# Patient Record
Sex: FEMALE | Race: WHITE
[De-identification: names, ages, dates, MRNs, and addresses within clinical notes are randomized per-mention and may not be internally consistent; named-entity substitution may affect disease eponyms.]

---

## 2018-01-31 ENCOUNTER — HOSPITAL ENCOUNTER (EMERGENCY)
Dept: HOSPITAL 72 - EMR | Age: 33
Discharge: HOME | End: 2018-01-31
Payer: COMMERCIAL

## 2018-01-31 VITALS — HEIGHT: 68 IN | BODY MASS INDEX: 21.22 KG/M2 | WEIGHT: 140 LBS

## 2018-01-31 VITALS — DIASTOLIC BLOOD PRESSURE: 101 MMHG | SYSTOLIC BLOOD PRESSURE: 147 MMHG

## 2018-01-31 VITALS — SYSTOLIC BLOOD PRESSURE: 128 MMHG | DIASTOLIC BLOOD PRESSURE: 84 MMHG

## 2018-01-31 VITALS — SYSTOLIC BLOOD PRESSURE: 131 MMHG | DIASTOLIC BLOOD PRESSURE: 76 MMHG

## 2018-01-31 VITALS — SYSTOLIC BLOOD PRESSURE: 129 MMHG | DIASTOLIC BLOOD PRESSURE: 90 MMHG

## 2018-01-31 DIAGNOSIS — F90.9: ICD-10-CM

## 2018-01-31 DIAGNOSIS — Z79.899: ICD-10-CM

## 2018-01-31 DIAGNOSIS — O03.1: Primary | ICD-10-CM

## 2018-01-31 DIAGNOSIS — F41.9: ICD-10-CM

## 2018-01-31 LAB
ADD MANUAL DIFF: NO
ALBUMIN SERPL-MCNC: 3.7 G/DL (ref 3.4–5)
ALBUMIN/GLOB SERPL: 1.3 {RATIO} (ref 1–2.7)
ALP SERPL-CCNC: 81 U/L (ref 46–116)
ALT SERPL-CCNC: 17 U/L (ref 12–78)
ANION GAP SERPL CALC-SCNC: 9 MMOL/L (ref 5–15)
APPEARANCE UR: CLEAR
APTT PPP: (no result) S
AST SERPL-CCNC: 19 U/L (ref 15–37)
BASOPHILS NFR BLD AUTO: 0.3 % (ref 0–2)
BILIRUB SERPL-MCNC: 0.5 MG/DL (ref 0.2–1)
BUN SERPL-MCNC: 5 MG/DL (ref 7–18)
CALCIUM SERPL-MCNC: 8.8 MG/DL (ref 8.5–10.1)
CHLORIDE SERPL-SCNC: 102 MMOL/L (ref 98–107)
CO2 SERPL-SCNC: 27 MMOL/L (ref 21–32)
CREAT SERPL-MCNC: 1 MG/DL (ref 0.55–1.3)
EOSINOPHIL NFR BLD AUTO: 0.6 % (ref 0–3)
ERYTHROCYTE [DISTWIDTH] IN BLOOD BY AUTOMATED COUNT: 10.2 % (ref 11.6–14.8)
GLOBULIN SER-MCNC: 2.8 G/DL
GLUCOSE UR STRIP-MCNC: NEGATIVE MG/DL
HCT VFR BLD CALC: 35.9 % (ref 37–47)
HGB BLD-MCNC: 12.8 G/DL (ref 12–16)
KETONES UR QL STRIP: NEGATIVE
LEUKOCYTE ESTERASE UR QL STRIP: NEGATIVE
LYMPHOCYTES NFR BLD AUTO: 13.7 % (ref 20–45)
MCV RBC AUTO: 92 FL (ref 80–99)
MONOCYTES NFR BLD AUTO: 5.8 % (ref 1–10)
NEUTROPHILS NFR BLD AUTO: 79.5 % (ref 45–75)
NITRITE UR QL STRIP: NEGATIVE
PH UR STRIP: 7 [PH] (ref 4.5–8)
PLATELET # BLD: 219 K/UL (ref 150–450)
POTASSIUM SERPL-SCNC: 4.3 MMOL/L (ref 3.5–5.1)
PROT UR QL STRIP: NEGATIVE
RBC # BLD AUTO: 3.89 M/UL (ref 4.2–5.4)
SODIUM SERPL-SCNC: 137 MMOL/L (ref 136–145)
SP GR UR STRIP: 1 (ref 1–1.03)
UROBILINOGEN UR-MCNC: NORMAL MG/DL (ref 0–1)
WBC # BLD AUTO: 8.6 K/UL (ref 4.8–10.8)

## 2018-01-31 PROCEDURE — 81003 URINALYSIS AUTO W/O SCOPE: CPT

## 2018-01-31 PROCEDURE — 86901 BLOOD TYPING SEROLOGIC RH(D): CPT

## 2018-01-31 PROCEDURE — 86900 BLOOD TYPING SEROLOGIC ABO: CPT

## 2018-01-31 PROCEDURE — 36415 COLL VENOUS BLD VENIPUNCTURE: CPT

## 2018-01-31 PROCEDURE — 85025 COMPLETE CBC W/AUTO DIFF WBC: CPT

## 2018-01-31 PROCEDURE — 96375 TX/PRO/DX INJ NEW DRUG ADDON: CPT

## 2018-01-31 PROCEDURE — 80053 COMPREHEN METABOLIC PANEL: CPT

## 2018-01-31 PROCEDURE — 86850 RBC ANTIBODY SCREEN: CPT

## 2018-01-31 PROCEDURE — 76856 US EXAM PELVIC COMPLETE: CPT

## 2018-01-31 PROCEDURE — 99284 EMERGENCY DEPT VISIT MOD MDM: CPT

## 2018-01-31 PROCEDURE — 76830 TRANSVAGINAL US NON-OB: CPT

## 2018-01-31 PROCEDURE — 84702 CHORIONIC GONADOTROPIN TEST: CPT

## 2018-01-31 PROCEDURE — 96374 THER/PROPH/DIAG INJ IV PUSH: CPT

## 2018-01-31 PROCEDURE — 96361 HYDRATE IV INFUSION ADD-ON: CPT

## 2018-01-31 NOTE — CONSULTATION
Consult Note


Consult Note


GYNECOLOGY ER CONSULT NOTE





CC: Heavy bleeding, incomplete 





HPI: Vanessa is a 33yo  s/p MedAb 2w ago presenting with heavy vaginal 

bleeding. [facility] for MedAb at ~7-8w. She 

reports light bleeding after medication, with some small clots, but no heavy 

bleeding until yesterday when she had heavy bleeding with passage of clot. She 

called [facility] and pad counts were recommended, with recommendation 

to go to ED for >1 pad per hour for more than 2 hours. She reports that she didn

't quite meet the cutoff, but that today she soaked through multiple super 

tampons and several pads prompting her visit to the ED. Currently denies 

lightheadedness, dizziness, or palpitations. The PA in the ED attempted to 

remove intracervical contents, but was unsuccessful, prompting the request for 

GYN consult. 





PMHx: ADHD and Anxiety





PSHx: D&C x1





Meds: Adderall, Xanax, Klonopin (all prn)





Allergies: NKDA





OBHx: , TAB x 1 and current





GYNHx: denies any previous GYN hx





VITALS:


Tmax 98.6, /76, P 96, RR 15, O2 100% RA





EXAM:


Gen: NAD, A&O, pleasant, no pallor noted


Neuro: Intact grossly


HEENT: MMM, OP clear


Neck: No thyromegaly


CV: RRR


Pulm: No increased work of breathing, speaking in full sentences


Abd: soft, NTND


Pelvic: Speculum exam revealed ~20cc of clot and POC noted in vaginal vault, os 

open, with POC protruding from cervical canal. 


Ext: WWP, FROM





Laboratory Tests








Test


  18


15:25 18


15:46


 


White Blood Count


  8.6 K/UL


(4.8-10.8) 


 


 


Red Blood Count


  3.89 M/UL


(4.20-5.40)  L 


 


 


Hemoglobin


  12.8 G/DL


(12.0-16.0) 


 


 


Hematocrit


  35.9 %


(37.0-47.0)  L 


 


 


Mean Corpuscular Volume 92 FL (80-99)   


 


Mean Corpuscular Hemoglobin


  32.8 PG


(27.0-31.0)  H 


 


 


Mean Corpuscular Hemoglobin


Concent 35.5 G/DL


(32.0-36.0) 


 


 


Red Cell Distribution Width


  10.2 %


(11.6-14.8)  L 


 


 


Platelet Count


  219 K/UL


(150-450) 


 


 


Mean Platelet Volume


  8.1 FL


(6.5-10.1) 


 


 


Neutrophils (%) (Auto)


  79.5 %


(45.0-75.0)  H 


 


 


Lymphocytes (%) (Auto)


  13.7 %


(20.0-45.0)  L 


 


 


Monocytes (%) (Auto)


  5.8 %


(1.0-10.0) 


 


 


Eosinophils (%) (Auto)


  0.6 %


(0.0-3.0) 


 


 


Basophils (%) (Auto)


  0.3 %


(0.0-2.0) 


 


 


Sodium Level


  137 MMOL/L


(136-145) 


 


 


Potassium Level


  4.3 MMOL/L


(3.5-5.1) 


 


 


Chloride Level


  102 MMOL/L


() 


 


 


Carbon Dioxide Level


  27 MMOL/L


(21-32) 


 


 


Anion Gap


  9 mmol/L


(5-15) 


 


 


Blood Urea Nitrogen


  5 mg/dL (7-18)


L 


 


 


Creatinine


  1.0 MG/DL


(0.55-1.30) 


 


 


Estimat Glomerular Filtration


Rate > 60 mL/min


(>60) 


 


 


Glucose Level


  102 MG/DL


() 


 


 


Calcium Level


  8.8 MG/DL


(8.5-10.1) 


 


 


Total Bilirubin


  0.5 MG/DL


(0.2-1.0) 


 


 


Aspartate Amino Transf


(AST/SGOT) 19 U/L (15-37)


  


 


 


Alanine Aminotransferase


(ALT/SGPT) 17 U/L (12-78)


  


 


 


Alkaline Phosphatase


  81 U/L


() 


 


 


Total Protein


  6.5 G/DL


(6.4-8.2) 


 


 


Albumin


  3.7 G/DL


(3.4-5.0) 


 


 


Globulin 2.8 g/dL   


 


Albumin/Globulin Ratio 1.3 (1.0-2.7)   


 


Human Chorionic Gonadotropin,


Quant 608 mIU/mL


(1-6)  H 


 


 


Urine Color  Pale yellow  


 


Urine Appearance  Clear  


 


Urine pH  7 (4.5-8.0)  


 


Urine Specific Gravity


  


  1.005


(1.005-1.035)


 


Urine Protein


  


  Negative


(NEGATIVE)


 


Urine Glucose (UA)


  


  Negative


(NEGATIVE)


 


Urine Ketones


  


  Negative


(NEGATIVE)


 


Urine Occult Blood


  


  5+ (NEGATIVE)


H


 


Urine Nitrite


  


  Negative


(NEGATIVE)


 


Urine Bilirubin


  


  Negative


(NEGATIVE)


 


Urine Urobilinogen


  


  Normal MG/DL


(0.0-1.0)


 


Urine Leukocyte Esterase


  


  Negative


(NEGATIVE)


 


Urine RBC


  


  2-4 /HPF (0 -


2)  H


 


Urine WBC


  


  0-2 /HPF (0 -


2)


 


Urine Squamous Epithelial


Cells 


  Few /LPF


(NONE/OCC)


 


Urine Bacteria


  


  Few /HPF


(NONE)








IMAGING: 


Report unavailable, but ED physician confirmed POC in cervical canal, and 

heterogeneous material in uterine cavity








PROCEDURE: 


Cervix cleansed with betadine x 4. Sterile polyp forceps inserted into cervical 

canal and POC grasped and withdrawn without incident. Gentle sweep of uterine 

cavity with fundal pressure revealed no remaining intrauterine contents. 

Bleeding minimal at end of procedure.


Assessment/Plan


33yo  with incomplete AB, now s/p removal of intrauterine/intracervical 

contents.


- Recommend follow [facility] at [facility]


- Intrauterine contents to be sent to Pathology


- Pelvic rest x 2 weeks


- Recommend Doxycycline 100mg BID x 7 days


- Patient will be monitored in the ED for ~1h for recurrence of bleeding, and 

if heavy bleeding resumes will need D&C, however this is unlikely given the 

findings on exam


- Return precautions given


- Patient advised that she will likely continue to bleed somewhat for the next 

several weeks


- Blood type O positive, no indication for Rhogam





Thank you for this interesting consult.





Signed:


Twyla Lowery MD, MPH











Twyla Lowery M.D. 2018 20:46

## 2018-01-31 NOTE — EMERGENCY ROOM REPORT
History of Present Illness


General


Chief Complaint:  Vaginal


Source:  Patient


 (Mikala Mohr)





Present Illness


HPI


32-year-old female presents to the emergency department complaining of 

significant vaginal bleeding x2 days with uterine cramping.  Patient reports 

that she took oral torsion he'll on the 12th of this month and had some scant 

spotting however hasn't had bleeding until male.  Patient iced fevers or chills 

she reports feeling lightheaded and intermittently dizzy.  Patient states she 

has been saturating absorbency pads every 2 hours and she is unable to stand on 

has discharge bleeding.  She is  with one prior  to her most 

recent one.  Patient does not know her blood type she has. Denies hx of STI.  

Denies CP, Palpitations, LOC, AMS, dizziness, Changes in Vision, Sensation, 

paresthesias, or a sudden severe headache. [facility] 


 (Mikala Mohr)


Allergies:  


Coded Allergies:  


     No Known Allergies (Unverified , 18)





Patient History


Past Medical History:  see triage record


Past Surgical History:  none


Pertinent Family History:  none


Last Menstrual Period:  


:  2


Para:  0


Reviewed Nursing Documentation:  PMH: Agreed, PSxH: Agreed (Mikala Mohr)





Nursing Documentation-PMH


Past Medical History:  No Stated History


 (Miklaa Mohr)





Review of Systems


All Other Systems:  negative except mentioned in HPI


 (Mikala Mohr)





Physical Exam





Vital Signs








  Date Time  Temp Pulse Resp B/P (MAP) Pulse Ox O2 Delivery O2 Flow Rate FiO2


 


18 14:54 98.4 128 16 135/87 99 Room Air  








Sp02 EP Interpretation:  reviewed, normal


General Appearance:  no apparent distress, alert, GCS 15, non-toxic


Head:  normocephalic, atraumatic


Eyes:  bilateral eye normal inspection, bilateral eye PERRL


ENT:  hearing grossly normal, normal voice


Neck:  full range of motion


Respiratory:  lungs clear, normal breath sounds, speaking full sentences


Cardiovascular #1:  tachycardia


Gastrointestinal:  normal bowel sounds, non tender, soft, non-distended


Genitourinary:  no CVA tenderness, adnexa normal, ext genitalia/vag normal, 

other - Multiple large clots in the vaginal vault and coming through the 

cervix. cervix is open and able to remove some clots from cervix, however 

continues to produce more.


Musculoskeletal:  back normal, gait/station normal, normal range of motion, non-

tender


Neurologic:  alert, oriented x3, responsive, motor strength/tone normal, 

sensory intact, speech normal, grossly normal


Psychiatric:  judgement/insight normal, anxious


Skin:  normal color, no rash, warm/dry, well hydrated


Lymphatic:  no adenopathy


 (Mikala Mohr)





Medical Decision Making


PA Attestation


Dr. Oliveros is my supervising Physician whom patient management has been 

discussed with. 


 (Mikala Mohr)


Diagnostic Impression:  


 Primary Impression:  


 Incomplete  with delayed or excessive hemorrhage


ER Course


32-year-old female presents to the emergency department complaining of 

significant vaginal bleeding x2 days with uterine cramping.  Patient reports 

that she took oral torsion he'll on the 12th of this month and had some scant 

spotting however hasn't had bleeding until male.  Patient iced fevers or chills 

she reports feeling lightheaded and intermittently dizzy.  Patient states she 

has been saturating absorbency pads every 2 hours and she is unable to stand on 

has discharge bleeding.  She is  with one prior  to her most 

recent one.  Patient does not know her blood type she has. Denies hx of STI.  

Denies CP, Palpitations, LOC, AMS, dizziness, Changes in Vision, Sensation, 

paresthesias, or a sudden severe headache. [facility] 





Ddx considered but are not limited to: Fibroid, ectopic pregnancy, Fibroid, 

Spontaneous , 


Vital signs: are WNL, pt. is afebrile 


Pelvic Exam: Multiple large clots in the vaginal vault and coming through the 

cervix. cervix is open and able to remove some clots from cervix, however 

continues to produce more. 


 


H&PE are most consistent with: incomplete  with excessive hemorrhage. 


ORDERS: 





-CBC, CMP: electrolytes ok, low RBC's but normal Hg. 


-Urine hcg- Positive 


-serum Hcg Quant:  608 


- Blood/RH type and screen- see attached labs - O POSITIVE





-Pelvic US complete-  "No normal intrauterine pregnancy identified. Vascular 

debris, presumed retained


products of conception in the lower uterine segment." Per radiology





ED INTERVENTIONS: 


- Toradol IV


-1 Liter NS


- Ativan





OBGYN Consult to Dr. De [facility] ,  Dr. Beverley MOBLEY MD presented to ED and 

removed remaining POC, hemorrhaging subsequently resolved.  Pt. stable to out 

pt. follow up at [facility] tomorrow. -- Pt to be d/c with PO Doxy. 





DISCHARGE: At this time pt. is stable for d/c to home with close [facility] 

[facility]  Tom[facility].  Will provide printed 

patient care instructions, and any necessary prescriptions. Care plan and 

follow up instructions have been discussed with the patient prior to discharge.





Labs








Test


  18


15:25 18


15:46


 


White Blood Count


  8.6 K/UL


(4.8-10.8) 


 


 


Red Blood Count


  3.89 M/UL


(4.20-5.40) 


 


 


Hemoglobin


  12.8 G/DL


(12.0-16.0) 


 


 


Hematocrit


  35.9 %


(37.0-47.0) 


 


 


Mean Corpuscular Volume 92 FL (80-99)  


 


Mean Corpuscular Hemoglobin


  32.8 PG


(27.0-31.0) 


 


 


Mean Corpuscular Hemoglobin


Concent 35.5 G/DL


(32.0-36.0) 


 


 


Red Cell Distribution Width


  10.2 %


(11.6-14.8) 


 


 


Platelet Count


  219 K/UL


(150-450) 


 


 


Mean Platelet Volume


  8.1 FL


(6.5-10.1) 


 


 


Neutrophils (%) (Auto)


  79.5 %


(45.0-75.0) 


 


 


Lymphocytes (%) (Auto)


  13.7 %


(20.0-45.0) 


 


 


Monocytes (%) (Auto)


  5.8 %


(1.0-10.0) 


 


 


Eosinophils (%) (Auto)


  0.6 %


(0.0-3.0) 


 


 


Basophils (%) (Auto)


  0.3 %


(0.0-2.0) 


 


 


Sodium Level


  137 MMOL/L


(136-145) 


 


 


Potassium Level


  4.3 MMOL/L


(3.5-5.1) 


 


 


Chloride Level


  102 MMOL/L


() 


 


 


Carbon Dioxide Level


  27 MMOL/L


(21-32) 


 


 


Anion Gap


  9 mmol/L


(5-15) 


 


 


Blood Urea Nitrogen 5 mg/dL (7-18)  


 


Creatinine


  1.0 MG/DL


(0.55-1.30) 


 


 


Estimat Glomerular Filtration


Rate > 60 mL/min


(>60) 


 


 


Glucose Level


  102 MG/DL


() 


 


 


Calcium Level


  8.8 MG/DL


(8.5-10.1) 


 


 


Total Bilirubin


  0.5 MG/DL


(0.2-1.0) 


 


 


Aspartate Amino Transf


(AST/SGOT) 19 U/L (15-37) 


  


 


 


Alanine Aminotransferase


(ALT/SGPT) 17 U/L (12-78) 


  


 


 


Alkaline Phosphatase


  81 U/L


() 


 


 


Total Protein


  6.5 G/DL


(6.4-8.2) 


 


 


Albumin


  3.7 G/DL


(3.4-5.0) 


 


 


Globulin 2.8 g/dL  


 


Albumin/Globulin Ratio 1.3 (1.0-2.7)  


 


Human Chorionic Gonadotropin,


Quant 608 mIU/mL


(1-6) 


 


 


Urine Color  Pale yellow 


 


Urine Appearance  Clear 


 


Urine pH  7 (4.5-8.0) 


 


Urine Specific Gravity


  


  1.005


(1.005-1.035)


 


Urine Protein


  


  Negative


(NEGATIVE)


 


Urine Glucose (UA)


  


  Negative


(NEGATIVE)


 


Urine Ketones


  


  Negative


(NEGATIVE)


 


Urine Occult Blood  5+ (NEGATIVE) 


 


Urine Nitrite


  


  Negative


(NEGATIVE)


 


Urine Bilirubin


  


  Negative


(NEGATIVE)


 


Urine Urobilinogen


  


  Normal MG/DL


(0.0-1.0)


 


Urine Leukocyte Esterase


  


  Negative


(NEGATIVE)


 


Urine RBC


  


  2-4 /HPF (0 -


2)


 


Urine WBC


  


  0-2 /HPF (0 -


2)


 


Urine Squamous Epithelial


Cells 


  Few /LPF


(NONE/OCC)


 


Urine Bacteria


  


  Few /HPF


(NONE)








 (Mikala Mohr P.A.)


ER Course


I was involved with this patient's care.


Ativan ordered.


Dr. Norman was called in and was able to remove POC.


Patient improved after.


O+ blood


 (Andrei Oliveros M.D.)





CT/MRI/US Diagnostic Results


CT/MRI/US Diagnostic Results :  


   Imaging Test Ordered:  Pelvic US


   Impression


"No normal intrauterine pregnancy identified. Vascular debris, presumed retained


products of conception in the lower uterine segment. Occult ectopic not 

excluded.


Correlation with trended beta-hCG, which follow-up and short-term interval 

follow-up


ultrasound recommended.No evidence suggest ovarian torsion." -Per official 

radiology report- Please see report for specific details.


 (Mikala Mohr P.A.)





Last Vital Signs








  Date Time  Temp Pulse Resp B/P (MAP) Pulse Ox O2 Delivery O2 Flow Rate FiO2


 


18 20:12 98.0 101 10 147/101 98 Room Air  








 (Mikala Mohr)





Last Vital Signs








  Date Time  Temp Pulse Resp B/P (MAP) Pulse Ox O2 Delivery O2 Flow Rate FiO2


 


18 21:45 98.2 65 15 128/84 98 Room Air  








Status:  improved


 (Andrei Oliveros M.D.)


Disposition:  HOME, SELF-CARE


Condition:  Improved


Scripts


Lorazepam* (ATIVAN*) 1 Mg Tablet


1 MG ORAL BEDTIME, #2 TAB


   Prov: Mikala Mohr         18 


Doxycycline Hyclate* (VIBRAMYCIN*) 100 Mg Capsule


100 MG ORAL EVERY 12 HOURS for 7 Days, #14 CAP 0 Refills


   Prov: Mikala Mohr         18


Referrals:  


LAINEY PATTON (PCP)


Departure Forms:  Return to Work      Return to Work Date:  2018


   Work Restrictions:  None


      Return to Full Activity:  2018


Patient Instructions:  Incomplete Miscarriage





Additional Instructions:  


Take medications as directed. 





 ** Follow up with a OBGYN  within 48 Hours , even if your symptoms have 

resolved. ** 





Return sooner to ED if new symptoms occur, or current symptoms become worse. 











- Please note that this Emergency Department Report was dictated using [employer] technology software, occasionally this can lead to 

erroneous entry secondary to interpretation by the dictation equipment.











Mikala Mohr 2018 20:55


Andrei Oliveros M.D. 2018 06:43

## 2018-02-01 NOTE — DIAGNOSTIC IMAGING REPORT
Indication: Pain. Positive pregnancy test.

 

Technique: Transabdominal and endovaginal pelvic ultrasound was performed.

 

Findings: Uterus measures 8.4 x 4.8 x 4.5 cm. There is vascular debris, presumed

retained products of conception in the lower uterine segment/cervix.

 

Right ovary measures 2.7 x 2.9 x 2.1 cm, 12 mL. Multiple small follicles noted. Left

ovary measures 3.6 x 3.2 x 1.5 cm, 9 mL. Question small corpus luteum cyst measuring

1.1 cm in diameter. No evidence to suggest ovarian torsion.

 

IMPRESSION:

No normal intrauterine pregnancy identified. Vascular debris, presumed retained

products of conception in the lower uterine segment. Occult ectopic not excluded.

Correlation with trended beta-hCG, which follow-up and short-term interval follow-up

ultrasound recommended.

 

No evidence suggest ovarian torsion.

 

This corresponds with the [facility] preliminary report.